# Patient Record
Sex: FEMALE | Race: OTHER | HISPANIC OR LATINO | URBAN - METROPOLITAN AREA
[De-identification: names, ages, dates, MRNs, and addresses within clinical notes are randomized per-mention and may not be internally consistent; named-entity substitution may affect disease eponyms.]

---

## 2017-03-02 ENCOUNTER — EMERGENCY (EMERGENCY)
Facility: HOSPITAL | Age: 52
LOS: 1 days | Discharge: HOME | End: 2017-03-02
Attending: EMERGENCY MEDICINE | Admitting: EMERGENCY MEDICINE
Payer: MEDICARE

## 2017-03-02 VITALS
SYSTOLIC BLOOD PRESSURE: 115 MMHG | HEART RATE: 76 BPM | TEMPERATURE: 98 F | DIASTOLIC BLOOD PRESSURE: 84 MMHG | RESPIRATION RATE: 16 BRPM | OXYGEN SATURATION: 100 %

## 2017-03-02 VITALS
SYSTOLIC BLOOD PRESSURE: 140 MMHG | OXYGEN SATURATION: 97 % | TEMPERATURE: 98 F | HEART RATE: 104 BPM | DIASTOLIC BLOOD PRESSURE: 93 MMHG | RESPIRATION RATE: 16 BRPM

## 2017-03-02 PROCEDURE — 99284 EMERGENCY DEPT VISIT MOD MDM: CPT | Mod: GC

## 2017-03-02 PROCEDURE — 93971 EXTREMITY STUDY: CPT | Mod: 26,LT

## 2017-03-02 RX ORDER — ACETAMINOPHEN 500 MG
650 TABLET ORAL ONCE
Qty: 0 | Refills: 0 | Status: COMPLETED | OUTPATIENT
Start: 2017-03-02 | End: 2017-03-02

## 2017-03-02 RX ADMIN — Medication 650 MILLIGRAM(S): at 11:53

## 2017-03-02 NOTE — ED PROVIDER NOTE - MEDICAL DECISION MAKING DETAILS
No evidence of cellulitis or soft tissue infection. Talib att: 50 yo woman presenting with right calf pain.  No e/o vascular insufficiency, no e/o soft tissue infection, no discoloration, no e/o cellulitis.  US pending to assess for DVT. Talib att: 50 yo woman presenting with right calf pain.  No e/o vascular insufficiency, no e/o soft tissue infection, no discoloration, no e/o cellulitis, no e/o DVT, no pain with axial loading, or concern for fracture.  Patient informed of ED visit findings, understands plan.  Patient provided with written and further verbal instructions not included in discharge paperwork.  Patient instructed to follow up with their primary care physician in 2-3 days and return for new, worsened, or persistent symptoms.

## 2017-03-02 NOTE — ED ADULT TRIAGE NOTE - CHIEF COMPLAINT QUOTE
states" I am having severe pain in my right leg starting from my feet to my knees." c/o pain to right calf. denies injury

## 2017-03-02 NOTE — ED PROVIDER NOTE - PHYSICAL EXAMINATION
Talib att: General: Well appearing, nontoxic, no acute distress; Head: Normocephalic Atraumatic; Eyes: PERRL, EOMI; ENT: Airway patent; Neck: supple; Chest: Lungs clear to auscultation bilateral; Cardiac: Regular rate and rhythm, no murmurs, rubs or gallops; Abdomen: soft, nontender, nondistended; no guarding or rebound; Musculoskeletal: right calf circumference is subtly greater than left. 2+ DP pulse b/l. No discoloration. Bilateral feet are warm. nontender, no palpable cord; Skin: No rash, normal skin tone; Neuro: Alert and Oriented to person, place, and time; No focal deficit, CN 2-12 symmetric and intact

## 2017-03-02 NOTE — ED PROVIDER NOTE - OBJECTIVE STATEMENT
50 y/o F pt presents to the ED for right lower leg pain. Declines : 50 y/o F pt presents to the ED for right lower leg pain.  Pain is cramping, intermittent, worse when she ambulates, not better with anything, present with rest as well.  Patient has a history of a superficial vein thrombosis.  No chest pain or dyspnea, no fever.

## 2017-03-05 ENCOUNTER — EMERGENCY (EMERGENCY)
Facility: HOSPITAL | Age: 52
LOS: 1 days | Discharge: HOME | End: 2017-03-05
Admitting: EMERGENCY MEDICINE
Payer: MEDICARE

## 2017-03-05 VITALS
SYSTOLIC BLOOD PRESSURE: 111 MMHG | OXYGEN SATURATION: 100 % | TEMPERATURE: 99 F | HEART RATE: 73 BPM | RESPIRATION RATE: 16 BRPM | DIASTOLIC BLOOD PRESSURE: 69 MMHG

## 2017-03-05 PROCEDURE — 99282 EMERGENCY DEPT VISIT SF MDM: CPT | Mod: 25

## 2017-03-05 NOTE — ED ADULT TRIAGE NOTE - CHIEF COMPLAINT QUOTE
pt was visiting her son on the 5 th floor and decided to come to the ED to be evaluated. pt comes to ED for nose bleed that started yesterday. Small amount of blood noted on tissue. No active bleeding in triage.  Also pt states she has a lump on the L side of her neck and wants a Dr to evaluate it.  Pt does not know when the lump developed and never saw a Dr. for the lump. No lump noted in triage pt is Egyptian speaking  services provided in triage. Afshin ID  number [id number]

## 2017-03-06 RX ORDER — SODIUM CHLORIDE 0.65 %
2 AEROSOL, SPRAY (ML) NASAL
Qty: 1 | Refills: 0 | OUTPATIENT
Start: 2017-03-06 | End: 2017-03-11

## 2017-03-06 NOTE — ED PROVIDER NOTE - MEDICAL DECISION MAKING DETAILS
1) episodes of epistaxis, resolved - pt noted to have dry nasal mucosa - saline spray, vaseline topical, 2) skin tag - no infection/abscess -derm f/u given

## 2017-03-06 NOTE — ED PROVIDER NOTE - CARE PLAN
Principal Discharge DX:	Nasal dryness  Instructions for follow-up, activity and diet:	PLEASE MAKE SURE THAT YOU USE THE SALINE SPRAY  - 2 SPRAYS EACH NOSTRIL 3-4 TIMES DAILY, USE THE OINTMENT TWICE DAILY . FOLLOW UP WITH ENT DOCTOR AS WELL AS DERMATOLOGIST TO GET THE SKIN TAG REMOVED.

## 2017-03-06 NOTE — ED PROVIDER NOTE - PLAN OF CARE
PLEASE MAKE SURE THAT YOU USE THE SALINE SPRAY  - 2 SPRAYS EACH NOSTRIL 3-4 TIMES DAILY, USE THE OINTMENT TWICE DAILY . FOLLOW UP WITH ENT DOCTOR AS WELL AS DERMATOLOGIST TO GET THE SKIN TAG REMOVED.

## 2017-03-06 NOTE — ED PROVIDER NOTE - OBJECTIVE STATEMENT
Hx taken via [facility] #[id number] Hx taken via   #[id number]. Pt is 52 y/o female with PMhx of hypothyroid here for eval of "nasal dryness" as well as episode of nose bleed earlier today. Pt states that she was visiting her son who is admitted, was trying to blow her nose and noticed some blod on the tissue. Got concerned as "her nose has been bothering her for while", denies HA, dizziness, trauma, denies use of blood thinners, denies easily bruising, denies sore throat, CP, SOB, no bleeding noted at the moment. Also c/o painful "lump" to the lt side of neck x 5 yrs, which pt hopes will be removed in the ER today. (-) fever, chills, redness, denies cough. Hx taken via   #[id number]. Pt is 50 y/o female with PMhx of hypothyroid here for eval of "nasal dryness" as well as episode of nose bleed earlier today. Pt states that she was visiting her son who is admitted, was trying to blow her nose and noticed some blood on the tissue. Got concerned as "her nose has been bothering her for while", denies HA, dizziness, trauma, denies use of blood thinners, denies easily bruising, denies sore throat, CP, SOB, no bleeding noted at the moment. Also c/o painful "lump" to the lt side of neck x 5 yrs, which pt hopes will be removed in the ER today. (-) fever, chills, redness, denies cough.

## 2017-03-06 NOTE — ED PROVIDER NOTE - NOSE FINDINGS
dried mucosa with small amount of dried blood in lt nostril, no clear source of bleeding noted; no TTP over sinuses

## 2018-07-01 ENCOUNTER — OUTPATIENT (OUTPATIENT)
Dept: OUTPATIENT SERVICES | Facility: HOSPITAL | Age: 53
LOS: 1 days | End: 2018-07-01
Payer: MEDICARE

## 2018-07-01 PROCEDURE — G9001: CPT

## 2018-07-07 ENCOUNTER — EMERGENCY (EMERGENCY)
Facility: HOSPITAL | Age: 53
LOS: 1 days | Discharge: HOME | End: 2018-07-07
Attending: EMERGENCY MEDICINE
Payer: COMMERCIAL

## 2018-07-07 VITALS
HEART RATE: 60 BPM | TEMPERATURE: 98 F | SYSTOLIC BLOOD PRESSURE: 118 MMHG | WEIGHT: 174.17 LBS | RESPIRATION RATE: 17 BRPM | OXYGEN SATURATION: 100 % | HEIGHT: 62 IN | DIASTOLIC BLOOD PRESSURE: 81 MMHG

## 2018-07-07 PROCEDURE — 99283 EMERGENCY DEPT VISIT LOW MDM: CPT | Mod: 25

## 2018-07-07 PROCEDURE — 73562 X-RAY EXAM OF KNEE 3: CPT

## 2018-07-07 PROCEDURE — 73562 X-RAY EXAM OF KNEE 3: CPT | Mod: 26,50

## 2018-07-07 RX ORDER — IBUPROFEN 200 MG
1 TABLET ORAL
Qty: 15 | Refills: 0 | OUTPATIENT
Start: 2018-07-07 | End: 2018-07-11

## 2018-07-07 RX ORDER — OXYCODONE AND ACETAMINOPHEN 5; 325 MG/1; MG/1
1 TABLET ORAL ONCE
Qty: 0 | Refills: 0 | Status: DISCONTINUED | OUTPATIENT
Start: 2018-07-07 | End: 2018-07-07

## 2018-07-07 RX ADMIN — OXYCODONE AND ACETAMINOPHEN 1 TABLET(S): 5; 325 TABLET ORAL at 10:23

## 2018-07-07 RX ADMIN — OXYCODONE AND ACETAMINOPHEN 1 TABLET(S): 5; 325 TABLET ORAL at 09:40

## 2018-07-07 NOTE — ED PROVIDER NOTE - OBJECTIVE STATEMENT
51y/o F pt with a significant PMHx of thyroid disease and a significant PSHx of bunionectomy presents to ED with intermittent and recurrent b/l knee pain. Patient admits to pain being worse since yesterday with the right knee pain being greater than left knee pain. Patient denies fever, chills or any other complaints. NKDA.

## 2018-07-07 NOTE — ED PROVIDER NOTE - MUSCULOSKELETAL NECK EXAM
no deformity, pain or tenderness. no restriction of movement/no knee swelling no effusion, full range of motion, mild limp

## 2018-07-07 NOTE — ED PROVIDER NOTE - MEDICAL DECISION MAKING DETAILS
53 y/o F pt presents to ED with recurrent b/l knee pain. Will check  x rays and orthopedic referral.

## 2018-07-23 DIAGNOSIS — Z71.89 OTHER SPECIFIED COUNSELING: ICD-10-CM

## 2019-05-14 ENCOUNTER — EMERGENCY (EMERGENCY)
Facility: HOSPITAL | Age: 54
LOS: 1 days | Discharge: HOME | End: 2019-05-14
Attending: EMERGENCY MEDICINE
Payer: COMMERCIAL

## 2019-05-14 VITALS
RESPIRATION RATE: 16 BRPM | WEIGHT: 164.91 LBS | OXYGEN SATURATION: 100 % | HEART RATE: 77 BPM | SYSTOLIC BLOOD PRESSURE: 128 MMHG | HEIGHT: 62 IN | TEMPERATURE: 98 F | DIASTOLIC BLOOD PRESSURE: 78 MMHG

## 2019-05-14 VITALS
SYSTOLIC BLOOD PRESSURE: 91 MMHG | TEMPERATURE: 98 F | HEART RATE: 71 BPM | OXYGEN SATURATION: 98 % | DIASTOLIC BLOOD PRESSURE: 56 MMHG | RESPIRATION RATE: 16 BRPM

## 2019-05-14 LAB
ANION GAP SERPL CALC-SCNC: 4 MMOL/L — LOW (ref 5–17)
BASOPHILS # BLD AUTO: 0.04 K/UL — SIGNIFICANT CHANGE UP (ref 0–0.2)
BASOPHILS NFR BLD AUTO: 0.8 % — SIGNIFICANT CHANGE UP (ref 0–2)
BUN SERPL-MCNC: 24 MG/DL — HIGH (ref 7–18)
CALCIUM SERPL-MCNC: 8.5 MG/DL — SIGNIFICANT CHANGE UP (ref 8.4–10.5)
CHLORIDE SERPL-SCNC: 106 MMOL/L — SIGNIFICANT CHANGE UP (ref 96–108)
CO2 SERPL-SCNC: 30 MMOL/L — SIGNIFICANT CHANGE UP (ref 22–31)
CREAT SERPL-MCNC: 0.96 MG/DL — SIGNIFICANT CHANGE UP (ref 0.5–1.3)
EOSINOPHIL # BLD AUTO: 0.12 K/UL — SIGNIFICANT CHANGE UP (ref 0–0.5)
EOSINOPHIL NFR BLD AUTO: 2.3 % — SIGNIFICANT CHANGE UP (ref 0–6)
GLUCOSE SERPL-MCNC: 129 MG/DL — HIGH (ref 70–99)
HCT VFR BLD CALC: 38.9 % — SIGNIFICANT CHANGE UP (ref 34.5–45)
HGB BLD-MCNC: 12.9 G/DL — SIGNIFICANT CHANGE UP (ref 11.5–15.5)
IMM GRANULOCYTES NFR BLD AUTO: 0.2 % — SIGNIFICANT CHANGE UP (ref 0–1.5)
LYMPHOCYTES # BLD AUTO: 2.29 K/UL — SIGNIFICANT CHANGE UP (ref 1–3.3)
LYMPHOCYTES # BLD AUTO: 43.4 % — SIGNIFICANT CHANGE UP (ref 13–44)
MAGNESIUM SERPL-MCNC: 2.3 MG/DL — SIGNIFICANT CHANGE UP (ref 1.6–2.6)
MCHC RBC-ENTMCNC: 28.4 PG — SIGNIFICANT CHANGE UP (ref 27–34)
MCHC RBC-ENTMCNC: 33.2 GM/DL — SIGNIFICANT CHANGE UP (ref 32–36)
MCV RBC AUTO: 85.7 FL — SIGNIFICANT CHANGE UP (ref 80–100)
MONOCYTES # BLD AUTO: 0.44 K/UL — SIGNIFICANT CHANGE UP (ref 0–0.9)
MONOCYTES NFR BLD AUTO: 8.3 % — SIGNIFICANT CHANGE UP (ref 2–14)
NEUTROPHILS # BLD AUTO: 2.38 K/UL — SIGNIFICANT CHANGE UP (ref 1.8–7.4)
NEUTROPHILS NFR BLD AUTO: 45 % — SIGNIFICANT CHANGE UP (ref 43–77)
NRBC # BLD: 0 /100 WBCS — SIGNIFICANT CHANGE UP (ref 0–0)
PHOSPHATE SERPL-MCNC: 3.1 MG/DL — SIGNIFICANT CHANGE UP (ref 2.5–4.5)
PLATELET # BLD AUTO: 224 K/UL — SIGNIFICANT CHANGE UP (ref 150–400)
POTASSIUM SERPL-MCNC: 3.8 MMOL/L — SIGNIFICANT CHANGE UP (ref 3.5–5.3)
POTASSIUM SERPL-SCNC: 3.8 MMOL/L — SIGNIFICANT CHANGE UP (ref 3.5–5.3)
RBC # BLD: 4.54 M/UL — SIGNIFICANT CHANGE UP (ref 3.8–5.2)
RBC # FLD: 12.9 % — SIGNIFICANT CHANGE UP (ref 10.3–14.5)
SODIUM SERPL-SCNC: 140 MMOL/L — SIGNIFICANT CHANGE UP (ref 135–145)
T4 FREE+ TSH PNL SERPL: 13.6 UU/ML — HIGH (ref 0.34–4.82)
WBC # BLD: 5.28 K/UL — SIGNIFICANT CHANGE UP (ref 3.8–10.5)
WBC # FLD AUTO: 5.28 K/UL — SIGNIFICANT CHANGE UP (ref 3.8–10.5)

## 2019-05-14 PROCEDURE — 99285 EMERGENCY DEPT VISIT HI MDM: CPT

## 2019-05-14 PROCEDURE — 72125 CT NECK SPINE W/O DYE: CPT | Mod: 26

## 2019-05-14 PROCEDURE — 99284 EMERGENCY DEPT VISIT MOD MDM: CPT | Mod: 25

## 2019-05-14 RX ORDER — SODIUM CHLORIDE 9 MG/ML
3 INJECTION INTRAMUSCULAR; INTRAVENOUS; SUBCUTANEOUS ONCE
Refills: 0 | Status: COMPLETED | OUTPATIENT
Start: 2019-05-14 | End: 2019-05-14

## 2019-05-14 RX ADMIN — SODIUM CHLORIDE 3 MILLILITER(S): 9 INJECTION INTRAMUSCULAR; INTRAVENOUS; SUBCUTANEOUS at 23:00

## 2019-05-14 NOTE — ED PROVIDER NOTE - PHYSICAL EXAMINATION
No cervical, thoracic or lumbosacral midline bony deformities,  +rotation and flexion-extension of neck and truncal area intact.   Pt able to abduct, pronate, supinate right arm.

## 2019-05-14 NOTE — ED ADULT NURSE NOTE - NSIMPLEMENTINTERV_GEN_ALL_ED
Implemented All Universal Safety Interventions:  [place] to call system. Call bell, personal items and telephone within reach. Instruct patient to call for assistance. Room bathroom lighting operational. Non-slip footwear when patient is off stretcher. Physically safe environment: no spills, clutter or unnecessary equipment. Stretcher in lowest position, wheels locked, appropriate side rails in place.

## 2019-05-14 NOTE — ED PROVIDER NOTE - NSFOLLOWUPINSTRUCTIONS_ED_ALL_ED_FT
Return if pain, weakness, numbness any concerns.  Follow up with Dr. Merrill neurologist at [address] [place] [address] this week.   See your doctor as soon as possible (within 1-2 days).   If you need further assistance for appointments you can contact the [place] Care Coordinator at [phone]. In addition our outpatient [facility] is located at [address] [place] [address], [place] [id number], tel: [phone].

## 2019-05-14 NOTE — ED ADULT NURSE NOTE - OBJECTIVE STATEMENT
Patient reports she has had intermittent head (occipital region- throbbing, pressure) and neck pain (sharp, radiating down) since July, denies injury/trauma. Over last few weeks it has started radiating down her right shoulder and arm and causes numbness/ "pins and needles" and weakness. Denies dizziness, vision changes, n/v, or chest pain.

## 2019-05-14 NOTE — ED PROVIDER NOTE - CLINICAL SUMMARY MEDICAL DECISION MAKING FREE TEXT BOX
12am- Case  neurologist Dr. Tate - who viewed CT images. Pt with radiculopathy, instructed to discharge pt with Gabapentin and will f/u with [facility] at [facility]. Pt agrees with plan, will also f/u with PMD concerning TSH levels and current Synthroid dose (88mcg). Pt is neurovascularly intact with full range of motion and strength of b/l arms. Pt is well appearing walking with normal gait, stable for discharge and follow up with medical doctor. Pt educated on care and need for follow up. Discussed anticipatory guidance and return precautions. Questions answered. I had a detailed discussion with the patient and/or guardian regarding the historical points, exam findings, and any diagnostic results supporting the discharge diagnosis.

## 2019-05-14 NOTE — ED PROVIDER NOTE - CARE PROVIDER_API CALL
Candida Merrill)  Neurology  [address] [place] [address]  Phone: [phone]  Fax: [phone]  Follow Up Time:

## 2019-05-14 NOTE — ED PROVIDER NOTE - NSFOLLOWUPCLINICS_GEN_ALL_ED_FT
[facility]  [facility]  [address] [facility] - [address] [place] [address]  Phone: [phone]  Fax: [phone]  Follow Up Time:

## 2019-05-14 NOTE — ED PROVIDER NOTE - OBJECTIVE STATEMENT
52 y/o F pt with a PMHx of Hypothyroidism (on Levothyroxine) and no significant PSHx presents to ED c/o R arm numbness since November 2018. Pt also notes accompanying cervical neck pain x6 months. Per pt, her arm numbness initially began as intermittent but as progressively grown to constant numbness for the last 3 months. Pt asserts that the numbness is worse with forearm rotation and arm abduction. Pt denies Hx of any injuries, trauma, heavy lifting, or any other acute complaints. NKDA.

## 2019-05-14 NOTE — CONSULT NOTE ADULT - SUBJECTIVE AND OBJECTIVE BOX
No weakness, mild numbness and pain with supination  CT C-Spine shows cervical spinal stenosis - This suggests a diagnosis of cervical radiculopathy    Recs:  - Can d/c with gabapentin 100mg BID x 3 days, then 100mg TID as goal dose  - F/u in Neuro clinic - [phone]      NOTE TO BE COMPLETED    Neurology Consult    Patient is a 53y old  Female who presents with a chief complaint of     HPI:      PAST MEDICAL & SURGICAL HISTORY:      FAMILY HISTORY:      Social History: (-) x 3    Allergies    No Known Allergies    Intolerances        MEDICATIONS  (STANDING):    MEDICATIONS  (PRN):      Review of systems:    Constitutional: No fever, weight loss or fatigue    Eyes: No eye pain or discharge  ENMT:  No difficulty hearing; No sinus or throat pain  Neck: No pain or stiffness  Respiratory: No cough, wheezing, chills or hemoptysis  Cardiovascular: No chest pain, palpitations, shortness of breath, dyspnea on exertion  Gastrointestinal: No abdominal pain, nausea, vomiting or hematemesis; No diarrhea or constipation.   Genitourinary: No dysuria, frequency, hematuria or incontinence  Neurological: As per HPI  Skin: No rashes or lesions   Endocrine: No heat or cold intolerance; No hair loss  Musculoskeletal: No joint pain or swelling  Psychiatric: No depression, anxiety, mood swings  Heme/Lymph: No easy bruising or bleeding gums    Vital Signs Last 24 Hrs  T(C): 36.6 (14 May 2019 23:26), Max: 36.7 (14 May 2019 20:34)  T(F): 97.8 (14 May 2019 23:26), Max: 98 (14 May 2019 20:34)  HR: 71 (14 May 2019 23:26) (71 - 77)  BP: 91/56 (14 May 2019 23:26) (91/56 - 128/78)  BP(mean): --  RR: 16 (14 May 2019 23:26) (16 - 16)  SpO2: 98% (14 May 2019 23:26) (98% - 100%)    Neurologic Examination:  General:  Appearance is consistent with chronologic age.  No abnormal facies.   General: The patient is oriented to person, place, time and date.  Recent and remote memory intact.  Fund of knowledge is intact and normal.  Language with normal repetition, comprehension and naming.  Nondysarthric.    Cranial nerves: intact VA, VFF.  EOMI w/o nystagmus, skew or reported double vision.  PERRL.  No ptosis/weakness of eyelid closure.  Facial sensation is normal with normal bite.  No facial asymmetry.  Hearing grossly intact b/l.  Palate elevates midline.  Tongue midline.  Motor examination:   Normal tone, bulk and range of motion.  No tenderness, twitching, tremors or involuntary movements.  Formal Muscle Strength Testing: (MRC grade R/L) 5/5 UE; 5/5 LE.  No observable drift.  Reflexes:   2+ b/l pectoralis, biceps, triceps, brachioradialis, patella and Achilles.  Plantar response downgoing b/l.  Jaw jerk, Shira, clonus absent.  Sensory examination:   Intact to light touch and pinprick, pain, temperature and proprioception and vibration in all extremities.  Cerebellum:   FTN/HKS intact with normal NANDA in all limbs.  No dysmetria or dysdiadokinesia.  Gait narrow based and normal.    Labs:   CBC Full  -  ( 14 May 2019 23:05 )  WBC Count : 5.28 K/uL  RBC Count : 4.54 M/uL  Hemoglobin : 12.9 g/dL  Hematocrit : 38.9 %  Platelet Count - Automated : 224 K/uL  Mean Cell Volume : 85.7 fl  Mean Cell Hemoglobin : 28.4 pg  Mean Cell Hemoglobin Concentration : 33.2 gm/dL  Auto Neutrophil # : 2.38 K/uL  Auto Lymphocyte # : 2.29 K/uL  Auto Monocyte # : 0.44 K/uL  Auto Eosinophil # : 0.12 K/uL  Auto Basophil # : 0.04 K/uL  Auto Neutrophil % : 45.0 %  Auto Lymphocyte % : 43.4 %  Auto Monocyte % : 8.3 %  Auto Eosinophil % : 2.3 %  Auto Basophil % : 0.8 %    05-14    140  |  106  |  24<H>  ----------------------------<  129<H>  3.8   |  30  |  0.96    Ca    8.5      14 May 2019 23:05  Phos  3.1     05-14  Mg     2.3     05-14                Neuroimaging:  NCHCT:   CT Angiography/Perfusion:  MRI Brain NC:  MRA Head/Neck:  EEG:    Assessment:  This is a 53y Female with h/o     Plan:   -   05-15-19 @ 00:01          Please contact the Neurology consult service with any questions.    Kendrick Merrill MD  Neurology Attending  [facility] No weakness, mild numbness and pain with right forearm supination  CT C-Spine shows cervical spinal stenosis - This suggests a diagnosis of cervical radiculopathy    Recs:  - Can d/c with gabapentin 100mg BID x 3 days, then 100mg TID as goal dose  - F/u in Neuro clinic - [phone]      NOTE TO BE COMPLETED    Neurology Consult    Patient is a 53y old  Female who presents with a chief complaint of     HPI:      PAST MEDICAL & SURGICAL HISTORY:      FAMILY HISTORY:      Social History: (-) x 3    Allergies    No Known Allergies    Intolerances        MEDICATIONS  (STANDING):    MEDICATIONS  (PRN):      Review of systems:    Constitutional: No fever, weight loss or fatigue    Eyes: No eye pain or discharge  ENMT:  No difficulty hearing; No sinus or throat pain  Neck: No pain or stiffness  Respiratory: No cough, wheezing, chills or hemoptysis  Cardiovascular: No chest pain, palpitations, shortness of breath, dyspnea on exertion  Gastrointestinal: No abdominal pain, nausea, vomiting or hematemesis; No diarrhea or constipation.   Genitourinary: No dysuria, frequency, hematuria or incontinence  Neurological: As per HPI  Skin: No rashes or lesions   Endocrine: No heat or cold intolerance; No hair loss  Musculoskeletal: No joint pain or swelling  Psychiatric: No depression, anxiety, mood swings  Heme/Lymph: No easy bruising or bleeding gums    Vital Signs Last 24 Hrs  T(C): 36.6 (14 May 2019 23:26), Max: 36.7 (14 May 2019 20:34)  T(F): 97.8 (14 May 2019 23:26), Max: 98 (14 May 2019 20:34)  HR: 71 (14 May 2019 23:26) (71 - 77)  BP: 91/56 (14 May 2019 23:26) (91/56 - 128/78)  BP(mean): --  RR: 16 (14 May 2019 23:26) (16 - 16)  SpO2: 98% (14 May 2019 23:26) (98% - 100%)    Neurologic Examination:  General:  Appearance is consistent with chronologic age.  No abnormal facies.   General: The patient is oriented to person, place, time and date.  Recent and remote memory intact.  Fund of knowledge is intact and normal.  Language with normal repetition, comprehension and naming.  Nondysarthric.    Cranial nerves: intact VA, VFF.  EOMI w/o nystagmus, skew or reported double vision.  PERRL.  No ptosis/weakness of eyelid closure.  Facial sensation is normal with normal bite.  No facial asymmetry.  Hearing grossly intact b/l.  Palate elevates midline.  Tongue midline.  Motor examination:   Normal tone, bulk and range of motion.  No tenderness, twitching, tremors or involuntary movements.  Formal Muscle Strength Testing: (MRC grade R/L) 5/5 UE; 5/5 LE.  No observable drift.  Reflexes:   2+ b/l pectoralis, biceps, triceps, brachioradialis, patella and Achilles.  Plantar response downgoing b/l.  Jaw jerk, Shira, clonus absent.  Sensory examination:   Intact to light touch and pinprick, pain, temperature and proprioception and vibration in all extremities.  Cerebellum:   FTN/HKS intact with normal NANDA in all limbs.  No dysmetria or dysdiadokinesia.  Gait narrow based and normal.    Labs:   CBC Full  -  ( 14 May 2019 23:05 )  WBC Count : 5.28 K/uL  RBC Count : 4.54 M/uL  Hemoglobin : 12.9 g/dL  Hematocrit : 38.9 %  Platelet Count - Automated : 224 K/uL  Mean Cell Volume : 85.7 fl  Mean Cell Hemoglobin : 28.4 pg  Mean Cell Hemoglobin Concentration : 33.2 gm/dL  Auto Neutrophil # : 2.38 K/uL  Auto Lymphocyte # : 2.29 K/uL  Auto Monocyte # : 0.44 K/uL  Auto Eosinophil # : 0.12 K/uL  Auto Basophil # : 0.04 K/uL  Auto Neutrophil % : 45.0 %  Auto Lymphocyte % : 43.4 %  Auto Monocyte % : 8.3 %  Auto Eosinophil % : 2.3 %  Auto Basophil % : 0.8 %    05-14    140  |  106  |  24<H>  ----------------------------<  129<H>  3.8   |  30  |  0.96    Ca    8.5      14 May 2019 23:05  Phos  3.1     05-14  Mg     2.3     05-14                Neuroimaging:  NCHCT:   CT Angiography/Perfusion:  MRI Brain NC:  MRA Head/Neck:  EEG:    Assessment:  This is a 53y Female with h/o     Plan:   -   05-15-19 @ 00:01          Please contact the Neurology consult service with any questions.    Kendrick Merrill MD  Neurology Attending  [facility] Neurology Consult    Patient is a 53y old  Female who presents with a chief complaint of     HPI:  53 RHF who reports having neck pain and right arm numbness, especially when rotating her right forearm or abducting her right shoulder, for the last six months. It has gradually affected her ability to maintain a  on writing utensils to be able to write. The neck pain has worsened earlier today, which is why she presents to the ED. She denies any head or neck trauma or recent strain or heavy lifting.    PAST MEDICAL & SURGICAL HISTORY:  Hypothyroidism    FAMILY HISTORY: Denies    Social History:  - No tobacco or illicit drug use  - Occasional alcohol use, no binge drinking    Allergies  No Known Allergies    HOME MEDICATIONS: Levothyroxine    Review of systems:    Constitutional: No fever, weight loss or fatigue    Eyes: No eye pain or discharge  ENMT:  No difficulty hearing; No sinus or throat pain  Neck: No pain or stiffness  Respiratory: No cough, wheezing, chills or hemoptysis  Cardiovascular: No chest pain, palpitations, shortness of breath, dyspnea on exertion  Gastrointestinal: No abdominal pain, nausea, vomiting or hematemesis; No diarrhea or constipation.   Genitourinary: No dysuria, frequency, hematuria or incontinence  Neurological: As per HPI  Skin: No rashes or lesions   Endocrine: No heat or cold intolerance; No hair loss  Musculoskeletal: As per HPI  Psychiatric: No depression, anxiety, mood swings  Heme/Lymph: No easy bruising or bleeding gums      Objective:    Vital Signs Last 24 Hrs  T(C): 36.6 (14 May 2019 23:26), Max: 36.7 (14 May 2019 20:34)  T(F): 97.8 (14 May 2019 23:26), Max: 98 (14 May 2019 20:34)  HR: 71 (14 May 2019 23:26) (71 - 77)  BP: 91/56 (14 May 2019 23:26) (91/56 - 128/78)  RR: 16 (14 May 2019 23:26) (16 - 16)  SpO2: 98% (14 May 2019 23:26) (98% - 100%)    General Exam:  General: No acute distress  Respiratory: CTAB/l.  No crackles, rhonchi, or wheezes.  Cardiovascular: RRR, No murmurs, Full b/l radial and pedal pulses  Musculoskeletal: Tenderness to palpation at central and right neck, and at right shoulder.    Neurological Exam:  General / Mental Status: Oriented to person, place, and time.  No dysarthria or aphasia present.  Naming and repetition intact.  Cranial Nerves: PERRLA, EOMI x 2, VFF x 4, No nystagmus or diplopia, Optic discs normal b/l.  B/l V1-V3 equal to light touch and pinprick.  Symmetric facial movement and palate elevation.  B/l hearing equal to finger rub.  5/5 strength with b/l sternocleidomastoid and trapezius.  Midline tongue protrusion with no atrophy or fasciculations.  Motor: Normal bulk and tone in all four extremities.  At least 4/5 strength with right shoulder abduction, limited by pain.  Mild right hand  weakness present.  Otherwise, 5/5 strength throughout all four extremities.  No downward drift, rigidity, spasticity, or tremors in any of the four extremities.  Sensation: Diminished to light touch and pinprick at right forearm.  Otherwise, intact to light touch and pinprick in all four extremities.  Negative Romberg.  Coordination: No dysmetria with b/l finger-to-nose and heel raise tests.  Normal rapid alternating movements b/l.  Reflexes: 2+ and symmetric at b/l biceps, triceps, brachioradialis, patellae, and ankles.  Toes flexor b/l.  Gait: Narrow-based and normal. No difficulty with tiptoe, heel, and tandem gaits.    Labs:   CBC Full  -  ( 14 May 2019 23:05 )  WBC Count : 5.28 K/uL  RBC Count : 4.54 M/uL  Hemoglobin : 12.9 g/dL  Hematocrit : 38.9 %  Platelet Count - Automated : 224 K/uL  Mean Cell Volume : 85.7 fl  Mean Cell Hemoglobin : 28.4 pg  Mean Cell Hemoglobin Concentration : 33.2 gm/dL  Auto Neutrophil # : 2.38 K/uL  Auto Lymphocyte # : 2.29 K/uL  Auto Monocyte # : 0.44 K/uL  Auto Eosinophil # : 0.12 K/uL  Auto Basophil # : 0.04 K/uL  Auto Neutrophil % : 45.0 %  Auto Lymphocyte % : 43.4 %  Auto Monocyte % : 8.3 %  Auto Eosinophil % : 2.3 %  Auto Basophil % : 0.8 %    05-14    140  |  106  |  24<H>  ----------------------------<  129<H>  3.8   |  30  |  0.96    Ca    8.5      14 May 2019 23:05  Phos  3.1     05-14  Mg     2.3     05-14      Neuroimaging:    CT Cervical Spine (05/14/2019):  - Mild/Moderate Multilevel spondylosis, resulting in mid-cervical neuroforaminal and central canal stenosis      Assessment:  53 RHF with right arm pain and sensory deficit secondary to likely cervical radiculopathy.      Recommendations:    1. Patient can be discharged with a prescription for gabapentin 100mg PO BID x 3 days, then titrate up to 100mg PO TID as goal dose.    2. Patient advised to avoid heavy lifting with the right arm, and that she may use warm compresses at the right neck and shoulder to help relieve associated pain.    3. Patient may follow up in [facility] for further testing and management: [phone].      The patient is advised about this plan and expresses agreement with it.      Please contact the Neurology consult service with any questions.    Kenrdick Merrill MD  Neurology Attending  [facility]

## 2019-05-15 PROCEDURE — 72125 CT NECK SPINE W/O DYE: CPT

## 2019-05-15 PROCEDURE — 80048 BASIC METABOLIC PNL TOTAL CA: CPT

## 2019-05-15 PROCEDURE — 83735 ASSAY OF MAGNESIUM: CPT

## 2019-05-15 PROCEDURE — 84100 ASSAY OF PHOSPHORUS: CPT

## 2019-05-15 PROCEDURE — 84443 ASSAY THYROID STIM HORMONE: CPT

## 2019-05-15 PROCEDURE — 36415 COLL VENOUS BLD VENIPUNCTURE: CPT

## 2019-05-15 PROCEDURE — 99284 EMERGENCY DEPT VISIT MOD MDM: CPT | Mod: 25

## 2019-05-15 PROCEDURE — 85027 COMPLETE CBC AUTOMATED: CPT

## 2019-05-15 RX ORDER — GABAPENTIN 400 MG/1
1 CAPSULE ORAL
Qty: 30 | Refills: 0
Start: 2019-05-15

## 2019-05-15 RX ORDER — GABAPENTIN 400 MG/1
100 CAPSULE ORAL ONCE
Refills: 0 | Status: COMPLETED | OUTPATIENT
Start: 2019-05-15 | End: 2019-05-15

## 2019-05-15 RX ADMIN — GABAPENTIN 100 MILLIGRAM(S): 400 CAPSULE ORAL at 00:34

## 2019-05-20 ENCOUNTER — APPOINTMENT (OUTPATIENT)
Dept: NEUROLOGY | Facility: CLINIC | Age: 54
End: 2019-05-20
Payer: MEDICARE

## 2019-05-20 VITALS
HEART RATE: 85 BPM | DIASTOLIC BLOOD PRESSURE: 63 MMHG | HEIGHT: 62 IN | WEIGHT: 184 LBS | TEMPERATURE: 98 F | OXYGEN SATURATION: 96 % | SYSTOLIC BLOOD PRESSURE: 109 MMHG | BODY MASS INDEX: 33.86 KG/M2

## 2019-05-20 DIAGNOSIS — Z83.3 FAMILY HISTORY OF DIABETES MELLITUS: ICD-10-CM

## 2019-05-20 DIAGNOSIS — Z78.9 OTHER SPECIFIED HEALTH STATUS: ICD-10-CM

## 2019-05-20 DIAGNOSIS — M54.12 RADICULOPATHY, CERVICAL REGION: ICD-10-CM

## 2019-05-20 DIAGNOSIS — G43.009 MIGRAINE W/OUT AURA, NOT INTRACTABLE, W/OUT STATUS MIGRAINOSUS: ICD-10-CM

## 2019-05-20 PROCEDURE — 99205 OFFICE O/P NEW HI 60 MIN: CPT

## 2019-05-20 RX ORDER — METHYLPREDNISOLONE 4 MG/1
4 TABLET ORAL
Qty: 1 | Refills: 0 | Status: ACTIVE | COMMUNITY
Start: 2019-05-20 | End: 1900-01-01

## 2019-05-20 RX ORDER — LEVOTHYROXINE SODIUM 0.17 MG/1
TABLET ORAL
Refills: 0 | Status: ACTIVE | COMMUNITY

## 2019-05-20 NOTE — HISTORY OF PRESENT ILLNESS
[FreeTextEntry1] : The patient is here for evaluation of a headache which started some Summer 2018. Headache is described as throbbing pain, continuous, moderate to severe in intensity. There is photo and phonophobia but no nausea and vomiting. No aura prior to the headaches. The patient takes over-the-counter medication without much relief of the headaches. No prior history of headaches.\par \par Additionally, the patient has neck pain radiating to her right arm associated with numbness and tingling. It is unclear she has weakness in her hands.  She takes Neurontin without much relief of the pain.\par \par The patient had an accident several years ago, when she fell and hurt her neck and head.

## 2019-05-20 NOTE — ASSESSMENT
[FreeTextEntry1] : New onset of headache with migrainous features in patient over age 50, should r/o cva and intercranial mass\par MRI brain\par EKG, will consider TCA for prevention\par medrol pack\par \par Cervical radiculopathy to the right side, unresponsive to OTC medications for months\par MRI C spine\par ncs/emg\par PT\par will consider TCA

## 2019-05-20 NOTE — REASON FOR VISIT
[Consultation] : a consultation visit [Source: ______] : History obtained from [unfilled] [FreeTextEntry1] : headache

## 2019-05-20 NOTE — REVIEW OF SYSTEMS
[As Noted in HPI] : as noted in HPI [Joint Pain] : joint pain [Muscle Weakness] : muscle weakness [Fever] : no fever [Anxiety] : no anxiety [Eyesight Problems] : no eyesight problems [Loss Of Hearing] : no hearing loss [Chest Pain] : no chest pain [Shortness Of Breath] : no shortness of breath [Vomiting] : no vomiting [Incontinence] : no incontinence [Itching] : no itching [Easy Bleeding] : no tendency for easy bleeding

## 2019-05-20 NOTE — PHYSICAL EXAM
[General Appearance - Alert] : alert [General Appearance - In No Acute Distress] : in no acute distress [Person] : oriented to person [Place] : oriented to place [Time] : oriented to time [Registration Intact] : recent registration memory intact [Concentration Intact] : normal concentrating ability [Visual Intact] : visual attention was ~T not ~L decreased [Naming Objects] : no difficulty naming common objects [Repeating Phrases] : no difficulty repeating a phrase [Fluency] : fluency intact [Comprehension] : comprehension intact [Vocabulary] : adequate range of vocabulary [Cranial Nerves Optic (II)] : visual acuity intact bilaterally,  visual fields full to confrontation, pupils equal round and reactive to light [Cranial Nerves Oculomotor (III)] : extraocular motion intact [Cranial Nerves Trigeminal (V)] : facial sensation intact symmetrically [Cranial Nerves Facial (VII)] : face symmetrical [Cranial Nerves Vestibulocochlear (VIII)] : hearing was intact bilaterally [Cranial Nerves Glossopharyngeal (IX)] : tongue and palate midline [Cranial Nerves Accessory (XI - Cranial And Spinal)] : head turning and shoulder shrug symmetric [Cranial Nerves Hypoglossal (XII)] : there was no tongue deviation with protrusion [Motor Tone] : muscle tone was normal in all four extremities [Motor Strength] : muscle strength was normal in all four extremities [Involuntary Movements] : no involuntary movements were seen [Sensation Tactile Decrease] : light touch was intact [Abnormal Walk] : normal gait [Balance] : balance was intact [1+] : Ankle jerk left 1+ [Full Pulse] : the pedal pulses are present [Coordination - Dysmetria Impaired Finger-to-Nose Bilateral] : not present [Coordination - Dysmetria Impaired Heel-to-Shin Bilateral] : not present [Plantar Reflex Right Only] : normal on the right [Plantar Reflex Left Only] : normal on the left [FreeTextEntry5] : fundi sharp

## 2019-06-27 ENCOUNTER — EMERGENCY (EMERGENCY)
Facility: HOSPITAL | Age: 54
LOS: 1 days | Discharge: HOME | End: 2019-06-27
Attending: STUDENT IN AN ORGANIZED HEALTH CARE EDUCATION/TRAINING PROGRAM
Payer: COMMERCIAL

## 2019-06-27 VITALS
WEIGHT: 169.98 LBS | OXYGEN SATURATION: 98 % | RESPIRATION RATE: 16 BRPM | TEMPERATURE: 98 F | HEART RATE: 80 BPM | SYSTOLIC BLOOD PRESSURE: 122 MMHG | HEIGHT: 61 IN | DIASTOLIC BLOOD PRESSURE: 74 MMHG

## 2019-06-27 VITALS
HEART RATE: 77 BPM | OXYGEN SATURATION: 99 % | DIASTOLIC BLOOD PRESSURE: 67 MMHG | RESPIRATION RATE: 20 BRPM | SYSTOLIC BLOOD PRESSURE: 100 MMHG

## 2019-06-27 LAB
ALBUMIN SERPL ELPH-MCNC: 3.8 G/DL — SIGNIFICANT CHANGE UP (ref 3.5–5)
ALP SERPL-CCNC: 62 U/L — SIGNIFICANT CHANGE UP (ref 40–120)
ALT FLD-CCNC: 26 U/L DA — SIGNIFICANT CHANGE UP (ref 10–60)
ANION GAP SERPL CALC-SCNC: 7 MMOL/L — SIGNIFICANT CHANGE UP (ref 5–17)
AST SERPL-CCNC: 14 U/L — SIGNIFICANT CHANGE UP (ref 10–40)
BASOPHILS # BLD AUTO: 0.03 K/UL — SIGNIFICANT CHANGE UP (ref 0–0.2)
BASOPHILS NFR BLD AUTO: 0.6 % — SIGNIFICANT CHANGE UP (ref 0–2)
BILIRUB SERPL-MCNC: 0.5 MG/DL — SIGNIFICANT CHANGE UP (ref 0.2–1.2)
BUN SERPL-MCNC: 17 MG/DL — SIGNIFICANT CHANGE UP (ref 7–18)
CALCIUM SERPL-MCNC: 8.6 MG/DL — SIGNIFICANT CHANGE UP (ref 8.4–10.5)
CHLORIDE SERPL-SCNC: 108 MMOL/L — SIGNIFICANT CHANGE UP (ref 96–108)
CO2 SERPL-SCNC: 26 MMOL/L — SIGNIFICANT CHANGE UP (ref 22–31)
CREAT SERPL-MCNC: 0.88 MG/DL — SIGNIFICANT CHANGE UP (ref 0.5–1.3)
EOSINOPHIL # BLD AUTO: 0.1 K/UL — SIGNIFICANT CHANGE UP (ref 0–0.5)
EOSINOPHIL NFR BLD AUTO: 2.1 % — SIGNIFICANT CHANGE UP (ref 0–6)
GLUCOSE SERPL-MCNC: 114 MG/DL — HIGH (ref 70–99)
HCG SERPL-ACNC: 3 MIU/ML — SIGNIFICANT CHANGE UP
HCT VFR BLD CALC: 34.3 % — LOW (ref 34.5–45)
HGB BLD-MCNC: 11.4 G/DL — LOW (ref 11.5–15.5)
IMM GRANULOCYTES NFR BLD AUTO: 0.2 % — SIGNIFICANT CHANGE UP (ref 0–1.5)
LYMPHOCYTES # BLD AUTO: 1.55 K/UL — SIGNIFICANT CHANGE UP (ref 1–3.3)
LYMPHOCYTES # BLD AUTO: 32.4 % — SIGNIFICANT CHANGE UP (ref 13–44)
MCHC RBC-ENTMCNC: 28.5 PG — SIGNIFICANT CHANGE UP (ref 27–34)
MCHC RBC-ENTMCNC: 33.2 GM/DL — SIGNIFICANT CHANGE UP (ref 32–36)
MCV RBC AUTO: 85.8 FL — SIGNIFICANT CHANGE UP (ref 80–100)
MONOCYTES # BLD AUTO: 0.43 K/UL — SIGNIFICANT CHANGE UP (ref 0–0.9)
MONOCYTES NFR BLD AUTO: 9 % — SIGNIFICANT CHANGE UP (ref 2–14)
NEUTROPHILS # BLD AUTO: 2.67 K/UL — SIGNIFICANT CHANGE UP (ref 1.8–7.4)
NEUTROPHILS NFR BLD AUTO: 55.7 % — SIGNIFICANT CHANGE UP (ref 43–77)
NRBC # BLD: 0 /100 WBCS — SIGNIFICANT CHANGE UP (ref 0–0)
PLATELET # BLD AUTO: 173 K/UL — SIGNIFICANT CHANGE UP (ref 150–400)
POTASSIUM SERPL-MCNC: 3.9 MMOL/L — SIGNIFICANT CHANGE UP (ref 3.5–5.3)
POTASSIUM SERPL-SCNC: 3.9 MMOL/L — SIGNIFICANT CHANGE UP (ref 3.5–5.3)
PROT SERPL-MCNC: 7.2 G/DL — SIGNIFICANT CHANGE UP (ref 6–8.3)
RBC # BLD: 4 M/UL — SIGNIFICANT CHANGE UP (ref 3.8–5.2)
RBC # FLD: 13.1 % — SIGNIFICANT CHANGE UP (ref 10.3–14.5)
SODIUM SERPL-SCNC: 141 MMOL/L — SIGNIFICANT CHANGE UP (ref 135–145)
WBC # BLD: 4.79 K/UL — SIGNIFICANT CHANGE UP (ref 3.8–10.5)
WBC # FLD AUTO: 4.79 K/UL — SIGNIFICANT CHANGE UP (ref 3.8–10.5)

## 2019-06-27 PROCEDURE — 99284 EMERGENCY DEPT VISIT MOD MDM: CPT

## 2019-06-27 PROCEDURE — 72125 CT NECK SPINE W/O DYE: CPT

## 2019-06-27 PROCEDURE — 73060 X-RAY EXAM OF HUMERUS: CPT

## 2019-06-27 PROCEDURE — 84702 CHORIONIC GONADOTROPIN TEST: CPT

## 2019-06-27 PROCEDURE — 70450 CT HEAD/BRAIN W/O DYE: CPT | Mod: 26

## 2019-06-27 PROCEDURE — 71046 X-RAY EXAM CHEST 2 VIEWS: CPT | Mod: 26

## 2019-06-27 PROCEDURE — 99284 EMERGENCY DEPT VISIT MOD MDM: CPT | Mod: 25

## 2019-06-27 PROCEDURE — 85027 COMPLETE CBC AUTOMATED: CPT

## 2019-06-27 PROCEDURE — 70450 CT HEAD/BRAIN W/O DYE: CPT

## 2019-06-27 PROCEDURE — 72125 CT NECK SPINE W/O DYE: CPT | Mod: 26

## 2019-06-27 PROCEDURE — 73060 X-RAY EXAM OF HUMERUS: CPT | Mod: 26,RT

## 2019-06-27 PROCEDURE — 80053 COMPREHEN METABOLIC PANEL: CPT

## 2019-06-27 PROCEDURE — 71046 X-RAY EXAM CHEST 2 VIEWS: CPT

## 2019-06-27 PROCEDURE — 36415 COLL VENOUS BLD VENIPUNCTURE: CPT

## 2019-06-27 RX ORDER — IBUPROFEN 200 MG
600 TABLET ORAL ONCE
Refills: 0 | Status: COMPLETED | OUTPATIENT
Start: 2019-06-27 | End: 2019-06-27

## 2019-06-27 RX ORDER — CYCLOBENZAPRINE HYDROCHLORIDE 10 MG/1
1 TABLET, FILM COATED ORAL
Qty: 10 | Refills: 0
Start: 2019-06-27

## 2019-06-27 RX ORDER — KETOROLAC TROMETHAMINE 30 MG/ML
30 SYRINGE (ML) INJECTION ONCE
Refills: 0 | Status: DISCONTINUED | OUTPATIENT
Start: 2019-06-27 | End: 2019-06-27

## 2019-06-27 RX ORDER — CYCLOBENZAPRINE HYDROCHLORIDE 10 MG/1
10 TABLET, FILM COATED ORAL ONCE
Refills: 0 | Status: COMPLETED | OUTPATIENT
Start: 2019-06-27 | End: 2019-06-27

## 2019-06-27 RX ADMIN — Medication 600 MILLIGRAM(S): at 22:30

## 2019-06-27 RX ADMIN — CYCLOBENZAPRINE HYDROCHLORIDE 10 MILLIGRAM(S): 10 TABLET, FILM COATED ORAL at 22:22
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
## 2019-06-27 NOTE — ED PROVIDER NOTE - PROGRESS NOTE DETAILS
patient ct negative. xray reviewed, no injury, neuro intact. given return precaution and f.u instruction

## 2019-06-27 NOTE — ED PROVIDER NOTE - CONSTITUTIONAL, MLM
Normal rate, regular rhythm.  Heart sounds S1, S2.  No murmurs, rubs or gallops. normal... Well appearing, well nourished, awake, alert, oriented to person, place, time/situation and in no apparent distress.
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
## 2019-06-27 NOTE — ED ADULT NURSE NOTE - NSIMPLEMENTINTERV_GEN_ALL_ED
Implemented All Universal Safety Interventions:  [place] to call system. Call bell, personal items and telephone within reach. Instruct patient to call for assistance. Room bathroom lighting operational. Non-slip footwear when patient is off stretcher. Physically safe environment: no spills, clutter or unnecessary equipment. Stretcher in lowest position, wheels locked, appropriate side rails in place.

## 2019-06-28 PROBLEM — E03.9 HYPOTHYROIDISM, UNSPECIFIED: Chronic | Status: ACTIVE | Noted: 2019-05-15

## 2019-07-18 ENCOUNTER — APPOINTMENT (OUTPATIENT)
Dept: NEUROLOGY | Facility: CLINIC | Age: 54
End: 2019-07-18

## 2019-07-23 ENCOUNTER — APPOINTMENT (OUTPATIENT)
Dept: INTERNAL MEDICINE | Facility: CLINIC | Age: 54
End: 2019-07-23

## 2019-08-28 ENCOUNTER — APPOINTMENT (OUTPATIENT)
Dept: NEUROLOGY | Facility: CLINIC | Age: 54
End: 2019-08-28

## 2023-06-15 NOTE — ED PROVIDER NOTE - NS PRO PASSIVE SMOKE EXP
Physical Therapy    Visit Type: treatment  SUBJECTIVE  Patient agreed to participate in therapy this date.  \"I'd just rather go home and I think I'm doing good.\"  Chronic pain in left ankle, but it varies. No pain at rest     OBJECTIVE          Transfers  Assistive devices: 2-wheeled walker  - Sit to stand: supervision, with verbal cues  - Stand to sit: modified independent  - Toilet: supervision, with verbal cues  Pt tends to push with 1 UE and reach for WW with other     Ambulation / Gait  - Assistive device: two-wheeled walker  - Distance (feet unless otherwise indicated): 3, 3, 40  - Assist Level: supervision  - Description: decreased danuta/pace  No imbalance for ARMANDO noted this date.    Stair Ambulation  - Number of steps: 1;   - Assist Level: minimal assist  - Devices Used: walker  Second Trial  Cuing for sequencing. Pain in left ankle limiting comfort. Pt reports having a ramp at home they could use and friend is coming over on Saturday to look at other options to make it easier. However, pt will have assist if 2 steps need to be done initially at discharge. Educated on gait belt use       Interventions     Training provided: transfer training, gait training, safety training, use of assistive device and stair training    Skilled input: Verbal instruction/cues  Verbal Consent: Writer verbally educated and received verbal consent for hand placement, positioning of patient, and techniques to be performed today from patient for clothing adjustments for techniques and therapist position for techniques as described above and how they are pertinent to the patient's plan of care.         ASSESSMENT   Progress: progressing toward goals    Summary of function and discharge needs based on today's assessment:   - Current level of function: slightly below baseline level of function   - Therapy needs at discharge: therapy 1-3 times per week   - Activities of daily living (ADLs) requiring support at discharge: stairs   -  Impairments that require further therapy intervention: activity tolerance and strength    AM-PAC  - Generalized Prior Level of Function: Needs a little help ([facility] 12-21)       Key: MOD A=moderate assistance, IND/MOD I=independent/modified independent  - Generalized Current Level of Function     - Current Mobility Score: 18       AM-PAC Scoring Key= >21 Modified Independent; 20-21 Supervision; 18-19 Minimal assist; 13-18 Moderate assist; 9-12 Max assist; <9 Total assist    Pt has declined MACK[facility]IE per SW. Pt confirms having multiple family/friends who can provide support 24/7. Pt is agreeable to home therapy.        PLAN   Suggestions for next session as indicated: LE strengthening HEP, review step sequencing unless pt plans to use ramp for sure. Ambulation    PT Frequency: 3-5 x per week      PT/OT Mobility Equipment for Discharge: none, owns ww        GOALS  Review Date: 6/22/2023  Short Term Goals:   Pt to participate in 15 minutes therex min assist in supine while on ventilator  Pt to sit EOB x 15 minutes with min assist while on ventilator  Long Term Goals: (to be met by time of discharge from hospital)  Sit to supine: Patient will complete sit to supine modified independent.  Supine to sit: Patient will complete supine to sit modified independent.  Sit to stand: Patient will complete sit to stand transfer with 2-wheeled walker, modified independent.   Stand to sit: Patient will complete stand to sit transfer with 2-wheeled walker, modified independent.   Status: met   Ambulation (even): Patient will ambulate on even surface for 20 feet with 2-wheeled walker, modified independent.   Stairs: Patient will ambulate 2 steps with total assist.     Documented in the chart in the following areas: Assessment/Plan.      Patient at End of Session:   Location: in chair  Safety measures: call light within reach  Handoff to: therapist      Therapy procedure time and total treatment time can be found documented on the Time Entry  flowsheet   No

## 2025-02-01 ENCOUNTER — EMERGENCY (EMERGENCY)
Facility: HOSPITAL | Age: 60
LOS: 0 days | Discharge: HOME | End: 2025-02-01
Payer: MEDICARE

## 2025-02-01 VITALS
HEART RATE: 67 BPM | RESPIRATION RATE: 17 BRPM | SYSTOLIC BLOOD PRESSURE: 110 MMHG | TEMPERATURE: 98 F | DIASTOLIC BLOOD PRESSURE: 74 MMHG | OXYGEN SATURATION: 98 %

## 2025-02-01 VITALS
WEIGHT: 169.98 LBS | HEART RATE: 76 BPM | OXYGEN SATURATION: 98 % | RESPIRATION RATE: 18 BRPM | DIASTOLIC BLOOD PRESSURE: 77 MMHG | HEIGHT: 61 IN | SYSTOLIC BLOOD PRESSURE: 131 MMHG | TEMPERATURE: 97 F

## 2025-02-01 DIAGNOSIS — Z83.3 FAMILY HISTORY OF DIABETES MELLITUS: ICD-10-CM

## 2025-02-01 DIAGNOSIS — R73.9 HYPERGLYCEMIA, UNSPECIFIED: ICD-10-CM

## 2025-02-01 DIAGNOSIS — R60.0 LOCALIZED EDEMA: ICD-10-CM

## 2025-02-01 DIAGNOSIS — E78.5 HYPERLIPIDEMIA, UNSPECIFIED: ICD-10-CM

## 2025-02-01 DIAGNOSIS — M54.50 LOW BACK PAIN, UNSPECIFIED: ICD-10-CM

## 2025-02-01 LAB
ALBUMIN SERPL ELPH-MCNC: 4.1 G/DL — SIGNIFICANT CHANGE UP (ref 3.3–5)
ALP SERPL-CCNC: 49 U/L — SIGNIFICANT CHANGE UP (ref 40–120)
ALT FLD-CCNC: 34 U/L — SIGNIFICANT CHANGE UP (ref 12–78)
ANION GAP SERPL CALC-SCNC: 2 MMOL/L — LOW (ref 5–17)
APPEARANCE UR: CLEAR — SIGNIFICANT CHANGE UP
AST SERPL-CCNC: 32 U/L — SIGNIFICANT CHANGE UP (ref 15–37)
BACTERIA # UR AUTO: NEGATIVE /HPF — SIGNIFICANT CHANGE UP
BASOPHILS # BLD AUTO: 0.04 K/UL — SIGNIFICANT CHANGE UP (ref 0–0.2)
BASOPHILS NFR BLD AUTO: 0.7 % — SIGNIFICANT CHANGE UP (ref 0–2)
BILIRUB SERPL-MCNC: 0.6 MG/DL — SIGNIFICANT CHANGE UP (ref 0.2–1.2)
BILIRUB UR-MCNC: NEGATIVE — SIGNIFICANT CHANGE UP
BUN SERPL-MCNC: 19 MG/DL — SIGNIFICANT CHANGE UP (ref 7–23)
CALCIUM SERPL-MCNC: 9.3 MG/DL — SIGNIFICANT CHANGE UP (ref 8.5–10.1)
CHLORIDE SERPL-SCNC: 106 MMOL/L — SIGNIFICANT CHANGE UP (ref 96–108)
CO2 SERPL-SCNC: 29 MMOL/L — SIGNIFICANT CHANGE UP (ref 22–31)
COLOR SPEC: YELLOW — SIGNIFICANT CHANGE UP
COMMENT - URINE 2: SIGNIFICANT CHANGE UP
CREAT SERPL-MCNC: 0.92 MG/DL — SIGNIFICANT CHANGE UP (ref 0.5–1.3)
DIFF PNL FLD: NEGATIVE — SIGNIFICANT CHANGE UP
EGFR: 71 ML/MIN/1.73M2 — SIGNIFICANT CHANGE UP
EGFR: 71 ML/MIN/1.73M2 — SIGNIFICANT CHANGE UP
EOSINOPHIL # BLD AUTO: 0.06 K/UL — SIGNIFICANT CHANGE UP (ref 0–0.5)
EOSINOPHIL NFR BLD AUTO: 1 % — SIGNIFICANT CHANGE UP (ref 0–6)
EPI CELLS # UR: SIGNIFICANT CHANGE UP
GLUCOSE SERPL-MCNC: 155 MG/DL — HIGH (ref 70–99)
GLUCOSE UR QL: NEGATIVE MG/DL — SIGNIFICANT CHANGE UP
HCT VFR BLD CALC: 33.7 % — LOW (ref 34.5–45)
HGB BLD-MCNC: 11.6 G/DL — SIGNIFICANT CHANGE UP (ref 11.5–15.5)
IMM GRANULOCYTES NFR BLD AUTO: 0.2 % — SIGNIFICANT CHANGE UP (ref 0–0.9)
KETONES UR-MCNC: NEGATIVE MG/DL — SIGNIFICANT CHANGE UP
LEUKOCYTE ESTERASE UR-ACNC: ABNORMAL
LYMPHOCYTES # BLD AUTO: 1.53 K/UL — SIGNIFICANT CHANGE UP (ref 1–3.3)
LYMPHOCYTES # BLD AUTO: 26.1 % — SIGNIFICANT CHANGE UP (ref 13–44)
MAGNESIUM SERPL-MCNC: 2.2 MG/DL — SIGNIFICANT CHANGE UP (ref 1.6–2.6)
MCHC RBC-ENTMCNC: 29.6 PG — SIGNIFICANT CHANGE UP (ref 27–34)
MCHC RBC-ENTMCNC: 34.4 G/DL — SIGNIFICANT CHANGE UP (ref 32–36)
MCV RBC AUTO: 86 FL — SIGNIFICANT CHANGE UP (ref 80–100)
MONOCYTES # BLD AUTO: 0.4 K/UL — SIGNIFICANT CHANGE UP (ref 0–0.9)
MONOCYTES NFR BLD AUTO: 6.8 % — SIGNIFICANT CHANGE UP (ref 2–14)
NEUTROPHILS # BLD AUTO: 3.82 K/UL — SIGNIFICANT CHANGE UP (ref 1.8–7.4)
NEUTROPHILS NFR BLD AUTO: 65.2 % — SIGNIFICANT CHANGE UP (ref 43–77)
NITRITE UR-MCNC: NEGATIVE — SIGNIFICANT CHANGE UP
NRBC # BLD: 0 /100 WBCS — SIGNIFICANT CHANGE UP (ref 0–0)
NRBC BLD-RTO: 0 /100 WBCS — SIGNIFICANT CHANGE UP (ref 0–0)
PH UR: 7 — SIGNIFICANT CHANGE UP (ref 5–8)
PHOSPHATE SERPL-MCNC: 2.8 MG/DL — SIGNIFICANT CHANGE UP (ref 2.5–4.5)
PLATELET # BLD AUTO: 156 K/UL — SIGNIFICANT CHANGE UP (ref 150–400)
POTASSIUM SERPL-MCNC: 3.9 MMOL/L — SIGNIFICANT CHANGE UP (ref 3.5–5.3)
POTASSIUM SERPL-SCNC: 3.9 MMOL/L — SIGNIFICANT CHANGE UP (ref 3.5–5.3)
PROT SERPL-MCNC: 7.6 GM/DL — SIGNIFICANT CHANGE UP (ref 6–8.3)
PROT UR-MCNC: NEGATIVE MG/DL — SIGNIFICANT CHANGE UP
RBC # BLD: 3.92 M/UL — SIGNIFICANT CHANGE UP (ref 3.8–5.2)
RBC # FLD: 13.6 % — SIGNIFICANT CHANGE UP (ref 10.3–14.5)
RBC CASTS # UR COMP ASSIST: SIGNIFICANT CHANGE UP /HPF (ref 0–4)
SODIUM SERPL-SCNC: 137 MMOL/L — SIGNIFICANT CHANGE UP (ref 135–145)
SP GR SPEC: 1.01 — SIGNIFICANT CHANGE UP (ref 1–1.03)
UROBILINOGEN FLD QL: 0.2 MG/DL — SIGNIFICANT CHANGE UP (ref 0.2–1)
WBC # BLD: 5.86 K/UL — SIGNIFICANT CHANGE UP (ref 3.8–10.5)
WBC # FLD AUTO: 5.86 K/UL — SIGNIFICANT CHANGE UP (ref 3.8–10.5)
WBC UR QL: SIGNIFICANT CHANGE UP /HPF (ref 0–5)

## 2025-02-01 PROCEDURE — 99284 EMERGENCY DEPT VISIT MOD MDM: CPT

## 2025-02-01 PROCEDURE — 72131 CT LUMBAR SPINE W/O DYE: CPT | Mod: 26

## 2025-02-01 RX ORDER — DIAZEPAM 5 MG/1
5 TABLET ORAL ONCE
Refills: 0 | Status: DISCONTINUED | OUTPATIENT
Start: 2025-02-01 | End: 2025-02-01

## 2025-02-01 RX ORDER — METHOCARBAMOL 500 MG/1
2 TABLET, FILM COATED ORAL
Qty: 12 | Refills: 0
Start: 2025-02-01 | End: 2025-02-03

## 2025-02-01 RX ORDER — IBUPROFEN 200 MG
1 TABLET ORAL
Qty: 12 | Refills: 0
Start: 2025-02-01 | End: 2025-02-03

## 2025-02-01 RX ORDER — ACETAMINOPHEN 500 MG/5ML
975 LIQUID (ML) ORAL ONCE
Refills: 0 | Status: COMPLETED | OUTPATIENT
Start: 2025-02-01 | End: 2025-02-01

## 2025-02-01 RX ORDER — ACETAMINOPHEN 500 MG/5ML
1000 LIQUID (ML) ORAL ONCE
Refills: 0 | Status: DISCONTINUED | OUTPATIENT
Start: 2025-02-01 | End: 2025-02-01

## 2025-02-01 RX ORDER — LIDOCAINE HYDROCHLORIDE 20 MG/ML
1 JELLY TOPICAL ONCE
Refills: 0 | Status: COMPLETED | OUTPATIENT
Start: 2025-02-01 | End: 2025-02-01

## 2025-02-01 RX ADMIN — LIDOCAINE HYDROCHLORIDE 1 PATCH: 20 JELLY TOPICAL at 13:43

## 2025-02-01 RX ADMIN — DIAZEPAM 5 MILLIGRAM(S): 5 TABLET ORAL at 10:07

## 2025-02-02 LAB
CULTURE RESULTS: SIGNIFICANT CHANGE UP
SPECIMEN SOURCE: SIGNIFICANT CHANGE UP